# Patient Record
Sex: MALE | URBAN - METROPOLITAN AREA
[De-identification: names, ages, dates, MRNs, and addresses within clinical notes are randomized per-mention and may not be internally consistent; named-entity substitution may affect disease eponyms.]

---

## 2024-06-11 ENCOUNTER — OFFICE VISIT (OUTPATIENT)
Dept: URBAN - METROPOLITAN AREA CLINIC 54 | Facility: CLINIC | Age: 59
End: 2024-06-11
Payer: COMMERCIAL

## 2024-06-11 ENCOUNTER — DASHBOARD ENCOUNTERS (OUTPATIENT)
Age: 59
End: 2024-06-11

## 2024-06-11 VITALS
DIASTOLIC BLOOD PRESSURE: 91 MMHG | HEART RATE: 66 BPM | SYSTOLIC BLOOD PRESSURE: 143 MMHG | BODY MASS INDEX: 32.87 KG/M2 | TEMPERATURE: 97.6 F | HEIGHT: 73 IN | WEIGHT: 248 LBS

## 2024-06-11 DIAGNOSIS — K76.0 HEPATIC STEATOSIS: ICD-10-CM

## 2024-06-11 DIAGNOSIS — E80.4 GILBERT'S SYNDROME: ICD-10-CM

## 2024-06-11 DIAGNOSIS — R74.8 ELEVATED LIVER ENZYMES: ICD-10-CM

## 2024-06-11 PROBLEM — 197321007: Status: ACTIVE | Noted: 2024-06-11

## 2024-06-11 PROBLEM — 27503000: Status: ACTIVE | Noted: 2024-06-11

## 2024-06-11 PROCEDURE — 99244 OFF/OP CNSLTJ NEW/EST MOD 40: CPT

## 2024-06-11 PROCEDURE — 99204 OFFICE O/P NEW MOD 45 MIN: CPT

## 2024-06-11 RX ORDER — ALUMINUM ZIRCONIUM TRICHLOROHYDREX GLY 0.19 G/G
1 TABLET 30 MINUTES BEFORE MORNING MEAL STICK TOPICAL ONCE A DAY
Status: ACTIVE | COMMUNITY

## 2024-06-11 RX ORDER — LOSARTAN POTASSIUM AND HYDROCHLOROTHIAZIDE 100; 25 MG/1; MG/1
1 TABLET TABLET, FILM COATED ORAL ONCE A DAY
Status: ACTIVE | COMMUNITY

## 2024-06-11 RX ORDER — DICLOFENAC SODIUM 75 MG/1
1 TABLET AS NEEDED TABLET, DELAYED RELEASE ORAL TWICE A DAY
Status: ACTIVE | COMMUNITY

## 2024-06-11 RX ORDER — SILDENAFIL 100 MG/1
1 TABLET AS NEEDED TABLET, FILM COATED ORAL ONCE A DAY
Status: ACTIVE | COMMUNITY

## 2024-06-11 NOTE — HPI-TODAY'S VISIT:
6/11/24: Patient was referred by Dr. Renee Anthony for elevated bilirubin. A copy of this document will be sent to the provider. Pt is a 60 yo male with a PMH of HTN and HLD (declines to start statins) who presents for elevated total bilirubin. Persistently elevated on available labs since Feb 2024 up to 2.1, indirect bili 1.8. Slight transaminitis with ALT 50, ALT 53, AST 34. Normal alk phos. Moderate to severe fatty liver on US, normal bile ducts. Denies liver related complaints. Denies excessive etoh consumption. No smoking or hx of IVDU. No family hx of liver disease. Herbal supplement includes a drink containing berberine, tumeric, dandelion, milk thistle, marquis.   RUQ US 5/23/24: - The pancreas and midline structures are likely obscured due to bowel gas. - Echogenic liver parenchyma compatible with moderate to severe diffuse hepatic steatosis.  Findingssuggest localized to fatty sparing in the gallbladder fossa region.  Hepatopedal  - No significant biliary dilatation.  Unremarkable gallbladder

## 2024-06-14 LAB
ACTIN (SMOOTH MUSCLE) ANTIBODY (IGG): <20
ALBUMIN/GLOBULIN RATIO: 2.2
ALBUMIN: 4.9
ALKALINE PHOSPHATASE: 49
ALPHA-1-ANTITRYPSIN QN: 139
ALT: 48
ANA SCREEN, IFA: POSITIVE
AST: 34
BILIRUBIN, DIRECT: 0.3
BILIRUBIN, INDIRECT: 1.5
BILIRUBIN, TOTAL: 1.8
CERULOPLASMIN: 19
FERRITIN, SERUM: 251
FIB 4 INDEX: 0.95
FIB 4 INTERPRETATION: (no result)
GLOBULIN: 2.2
IMMUNOGLOBULIN A: 86
IMMUNOGLOBULIN G: 769
IMMUNOGLOBULIN M: 60
INR: 1
INTERPRETATION: (no result)
IRON BIND.CAP.(TIBC): 384
IRON SATURATION: 39
IRON: 149
MITOCHONDRIAL (M2) ANTIBODY: <=20
PLATELET COUNT: 293
PROTEIN, TOTAL: 7.1
PT: 10.3
RHEUMATOID FACTOR: <10
SJOGREN'S ANTIBODY (SS-A): (no result)
SJOGREN'S ANTIBODY (SS-B): (no result)